# Patient Record
(demographics unavailable — no encounter records)

---

## 2024-12-16 NOTE — ASSESSMENT
[FreeTextEntry1] : After discussing various treatment options with the patient including but not limited to oral medications, physical therapy, exercise, modalities as well as interventional spinal injections, we have decided with the following plan:  1) Intervention Injection Therapy: I personally reviewed the MRI/CT scan images and agree with the radiologist's report. The radiological findings were discussed with the patient. The risks, benefits, contents and alternatives to injection were explained in full to the patient. Risks outlined include but are not limited to infection,sepsis, bleeding, post-dural puncture headache, nerve damage, temporary increase in pain, syncopal episode, failure to resolve symptoms, allergic reaction, symptom recurrence, and elevation of blood sugar in diabetics. Cortisone may cause immunosuppression. Patient understands the risks. All questions were answered. After discussion of options, patient requested an injection. Information regarding the injection was given to the patient. Which medications to stop prior to the injection was explained to the patient as well.  Follow up in 1-2 weeks post injection for re-evaluation.  Continue Home exercises, stretching, activity modification, physical therapy, and conservative care.  Patient is presenting with acute/sub-acute radicular pain with impairment in ADLs and functionality.  The pain has not responded sufficiently to  conservative care including nsaid therapy and/or physical therapy.  There is no bleeding tendency, unstable medical condition, or systemic infection. The purpose of the spinal injections is to facilitate active therapy by providing short term relief through reduction of pain and inflammation.   Injections, by themselves, are not likely to provide long-term relief. Rather, active rehabilitation with modified work achieves long-term relief by increasing active ROM, strength and stability.   C7-T1 Cervical Epidural Steroid Injection under fluoroscopic guidance with image. Of note, the C7-T1 level has been determined to be the safest level to inject in the cervical spine as the epidural space is the largest. Despite pathology at different levels, studies have shown that the medication will spread up to the most cranial level, despite the level of injection.   2) The patient would benefit from continuation of physical therapy. Short and Long Term goals would be improvement of pain level, improvement of range of motion, improvement of strength and overall improvement of quality of life.  Patient instructed to continue both active and passive therapy, at home as an extension of the treatment process in order to maintain improvement.   Goals: improve cardiovascular fitness, reduce edema, improve muscle strength, improve connective tissue strength and integrity, increase bone density, promote circulation to enhance soft tissue healing, improvement of muscle recruitment, increased ROM and promotion of normal movement.

## 2024-12-16 NOTE — PHYSICAL EXAM
[Rotation to left] : rotation to left [] : full ROM with pain [de-identified] : left lateral rotation 60 degrees [TWNoteComboBox6] : right lateral rotation 75 degrees

## 2024-12-16 NOTE — HISTORY OF PRESENT ILLNESS
[Neck] : neck [5] : 5 [4] : 4 [Dull/Aching] : dull/aching [Radiating] : radiating [Constant] : constant [Household chores] : household chores [Leisure] : leisure [Meds] : meds [Heat] : heat [Injection therapy] : injection therapy [Nothing helps with pain getting better] : Nothing helps with pain getting better [Bending forward] : bending forward [FreeTextEntry1] : 12/16/2024: follow up today   4/2/24- Did not have much relief from RFA.  Has pain on left side down to shoulder.  Pain goes to deltoid.  No numbness or tingling.    220/24- fu for Left C3-C6 RFA on 2/7 with 10% relief so far.  Has some pain back at her baseline.    11/28/2023: follow up today for  Left C3-4,C4-5,C5-6, Facet block on 11/15 with 80% relief.  Had relief from MBB last year.  Will schedule RFA.    10/10/2023: follow up today for MRI- 9/7/2024 1. Slight reversal of cervical lordosis associated with minimal anterolisthesis of C3 on C4, not significantly changed. 2. Multilevel disc degenerative disease, most pronounced at C5-6 and C6-7 with slightly progressed disc space narrowing at C5-6 and C6-7. 3. Broad-based disc bulge at C3-4 extends out laterally into left neural foramen contributing to severe left foraminal stenosis, slightly worse when compared to prior MRI. There would be potential to impinge the left-sided exiting C4 nerve root. 4. Left central disc protrusion at C4-5, slightly increased when compared to prior study, causes at least mild cord compression. Right-sided uncovertebral joint hypertrophy causes severe right foraminal stenosis, unchanged when compared to prior MRI, with potential to impinge the right-sided exiting C5 nerve root. Clinical correlation recommended. 5. Right central disc protrusion osteophyte complex is slightly more focal when compared to prior exam. Please see above for additional details.  Pain mostly in the left axial neck with radiation to the left shoulder.    8/8/23: Patient presents for initial evaluation.  She c/o pain in neck.  Saw Dr. Campos 2 years ago and had PT with some relief.  Pain is in trap muscle on left and into left shoulder and behind ear.  Has headaches.  Had an epidural and then 1 facet injection.  Had CTR surgery in April with relief.  Has headaches every day.    Subjective Weakness: No Numbness/Tingling: No Bladder/Bowel dysfunction: No Gait Abnormalities: No Fine motor coordination changes: No  Attempted modalities for current pain complaint: See above: Medications: Yes- Tylenol, fiorecet.  Injections: Yes- epidural 8/2022 facet November 2022  Left C3-4,C4-5,C5-6, Facet block 11/15/23 left C3-C6 RFA 2/7/24 Previous Spine Surgery: No  Imaging: MRI Cervical Spine ZP: (6/30/21) Degenerative disc disease and facet arthrosis throughout the cervical spine. There is reversal of the cervical lordosis and grade 1 anterolisthesis at C2-C3 and C3-C4.  At C3-C4 there is a shallow posterior disc protrusion. There is severe left facet arthrosis. There is moderate to severe left foraminal stenosis.  At C4-C5 there is a small central disc protrusion. There is moderate to severe thecal sac compression and mild compression of the spinal cord. There is severe right and moderate left foraminal stenosis.  At C5-C6 there is mild to moderate thecal sac compression and moderate foraminal stenosis. [] : no [FreeTextEntry7] : left side, behind the ear, down the left shoulder

## 2025-01-14 NOTE — PHYSICAL EXAM
[Rotation to left] : rotation to left [] : full ROM with pain [de-identified] : left lateral rotation 60 degrees [TWNoteComboBox6] : right lateral rotation 75 degrees

## 2025-01-14 NOTE — HISTORY OF PRESENT ILLNESS
[Neck] : neck [5] : 5 [Dull/Aching] : dull/aching [Radiating] : radiating [Constant] : constant [Household chores] : household chores [Leisure] : leisure [Meds] : meds [Heat] : heat [Injection therapy] : injection therapy [Nothing helps with pain getting better] : Nothing helps with pain getting better [Bending forward] : bending forward [4] : 4 [Physical therapy] : physical therapy [FreeTextEntry1] : 1/14/25 follow up for the neck pt wants script for physical therapy script.  Did not get much relief from RFA.    4/2/24- Did not have much relief from RFA.  Has pain on left side down to shoulder.  Pain goes to deltoid.  No numbness or tingling.    220/24- fu for Left C3-C6 RFA on 2/7 with 10% relief so far.  Has some pain back at her baseline.    11/28/2023: follow up today for  Left C3-4,C4-5,C5-6, Facet block on 11/15 with 80% relief.  Had relief from MBB last year.  Will schedule RFA.    10/10/2023: follow up today for MRI- 9/7/2024 1. Slight reversal of cervical lordosis associated with minimal anterolisthesis of C3 on C4, not significantly changed. 2. Multilevel disc degenerative disease, most pronounced at C5-6 and C6-7 with slightly progressed disc space narrowing at C5-6 and C6-7. 3. Broad-based disc bulge at C3-4 extends out laterally into left neural foramen contributing to severe left foraminal stenosis, slightly worse when compared to prior MRI. There would be potential to impinge the left-sided exiting C4 nerve root. 4. Left central disc protrusion at C4-5, slightly increased when compared to prior study, causes at least mild cord compression. Right-sided uncovertebral joint hypertrophy causes severe right foraminal stenosis, unchanged when compared to prior MRI, with potential to impinge the right-sided exiting C5 nerve root. Clinical correlation recommended. 5. Right central disc protrusion osteophyte complex is slightly more focal when compared to prior exam. Please see above for additional details.  Pain mostly in the left axial neck with radiation to the left shoulder.    8/8/23: Patient presents for initial evaluation.  She c/o pain in neck.  Saw Dr. Campos 2 years ago and had PT with some relief.  Pain is in trap muscle on left and into left shoulder and behind ear.  Has headaches.  Had an epidural and then 1 facet injection.  Had CTR surgery in April with relief.  Has headaches every day.    Subjective Weakness: No Numbness/Tingling: No Bladder/Bowel dysfunction: No Gait Abnormalities: No Fine motor coordination changes: No  Attempted modalities for current pain complaint: See above: Medications: Yes- Tylenol, fiorecet.  Injections: Yes- epidural 8/2022 facet November 2022  Left C3-4,C4-5,C5-6, Facet block 11/15/23 left C3-C6 RFA 2/7/24 Previous Spine Surgery: No  Imaging: MRI Cervical Spine ZP: (6/30/21) Degenerative disc disease and facet arthrosis throughout the cervical spine. There is reversal of the cervical lordosis and grade 1 anterolisthesis at C2-C3 and C3-C4.  At C3-C4 there is a shallow posterior disc protrusion. There is severe left facet arthrosis. There is moderate to severe left foraminal stenosis.  At C4-C5 there is a small central disc protrusion. There is moderate to severe thecal sac compression and mild compression of the spinal cord. There is severe right and moderate left foraminal stenosis.  At C5-C6 there is mild to moderate thecal sac compression and moderate foraminal stenosis. [] : no [FreeTextEntry7] : left side, behind the ear, down the left shoulder

## 2025-01-14 NOTE — PHYSICAL EXAM
[Rotation to left] : rotation to left [] : full ROM with pain [de-identified] : left lateral rotation 60 degrees [TWNoteComboBox6] : right lateral rotation 75 degrees
